# Patient Record
Sex: MALE | Race: BLACK OR AFRICAN AMERICAN | NOT HISPANIC OR LATINO
[De-identification: names, ages, dates, MRNs, and addresses within clinical notes are randomized per-mention and may not be internally consistent; named-entity substitution may affect disease eponyms.]

---

## 2017-04-07 ENCOUNTER — APPOINTMENT (OUTPATIENT)
Dept: RADIATION ONCOLOGY | Facility: CLINIC | Age: 57
End: 2017-04-07

## 2017-04-07 VITALS
HEIGHT: 67 IN | TEMPERATURE: 96.9 F | SYSTOLIC BLOOD PRESSURE: 131 MMHG | RESPIRATION RATE: 16 BRPM | WEIGHT: 232.24 LBS | DIASTOLIC BLOOD PRESSURE: 78 MMHG | OXYGEN SATURATION: 95 % | BODY MASS INDEX: 36.45 KG/M2 | HEART RATE: 56 BPM

## 2017-10-31 ENCOUNTER — APPOINTMENT (OUTPATIENT)
Dept: RADIATION ONCOLOGY | Facility: CLINIC | Age: 57
End: 2017-10-31
Payer: COMMERCIAL

## 2017-10-31 VITALS
SYSTOLIC BLOOD PRESSURE: 125 MMHG | HEART RATE: 64 BPM | HEIGHT: 67 IN | RESPIRATION RATE: 16 BRPM | DIASTOLIC BLOOD PRESSURE: 78 MMHG | OXYGEN SATURATION: 95 % | BODY MASS INDEX: 36.1 KG/M2 | TEMPERATURE: 98.1 F | WEIGHT: 230 LBS

## 2017-10-31 PROCEDURE — 99214 OFFICE O/P EST MOD 30 MIN: CPT | Mod: GC

## 2017-12-14 ENCOUNTER — APPOINTMENT (OUTPATIENT)
Dept: NEPHROLOGY | Facility: CLINIC | Age: 57
End: 2017-12-14

## 2017-12-20 ENCOUNTER — APPOINTMENT (OUTPATIENT)
Dept: NEPHROLOGY | Facility: CLINIC | Age: 57
End: 2017-12-20
Payer: COMMERCIAL

## 2017-12-20 VITALS
DIASTOLIC BLOOD PRESSURE: 87 MMHG | SYSTOLIC BLOOD PRESSURE: 134 MMHG | BODY MASS INDEX: 37.99 KG/M2 | WEIGHT: 242.06 LBS | HEIGHT: 67 IN | OXYGEN SATURATION: 96 % | HEART RATE: 70 BPM

## 2017-12-20 VITALS — SYSTOLIC BLOOD PRESSURE: 140 MMHG | DIASTOLIC BLOOD PRESSURE: 80 MMHG

## 2017-12-20 DIAGNOSIS — Z87.39 PERSONAL HISTORY OF OTHER DISEASES OF THE MUSCULOSKELETAL SYSTEM AND CONNECTIVE TISSUE: ICD-10-CM

## 2017-12-20 DIAGNOSIS — Z87.19 PERSONAL HISTORY OF OTHER DISEASES OF THE DIGESTIVE SYSTEM: ICD-10-CM

## 2017-12-20 DIAGNOSIS — Z86.79 PERSONAL HISTORY OF OTHER DISEASES OF THE CIRCULATORY SYSTEM: ICD-10-CM

## 2017-12-20 DIAGNOSIS — Z87.891 PERSONAL HISTORY OF NICOTINE DEPENDENCE: ICD-10-CM

## 2017-12-20 DIAGNOSIS — Z78.9 OTHER SPECIFIED HEALTH STATUS: ICD-10-CM

## 2017-12-20 PROCEDURE — 99204 OFFICE O/P NEW MOD 45 MIN: CPT

## 2017-12-21 LAB
ALBUMIN MFR SERPL ELPH: 54 %
ALBUMIN SERPL ELPH-MCNC: 4.6 G/DL
ALBUMIN SERPL-MCNC: 4.4 G/DL
ALBUMIN/GLOB SERPL: 1.2 RATIO
ALPHA1 GLOB MFR SERPL ELPH: 4.3 %
ALPHA1 GLOB SERPL ELPH-MCNC: 0.3 G/DL
ALPHA2 GLOB MFR SERPL ELPH: 10.9 %
ALPHA2 GLOB SERPL ELPH-MCNC: 0.9 G/DL
ANION GAP SERPL CALC-SCNC: 16 MMOL/L
B-GLOBULIN MFR SERPL ELPH: 15 %
B-GLOBULIN SERPL ELPH-MCNC: 1.2 G/DL
BUN SERPL-MCNC: 12 MG/DL
CALCIUM SERPL-MCNC: 9.7 MG/DL
CHLORIDE SERPL-SCNC: 101 MMOL/L
CO2 SERPL-SCNC: 24 MMOL/L
CREAT SERPL-MCNC: 0.94 MG/DL
CREAT SPEC-SCNC: 192 MG/DL
CREAT/PROT UR: 0.4 RATIO
DEPRECATED KAPPA LC FREE/LAMBDA SER: 1.19 RATIO
DSDNA AB SER-ACNC: <12 IU/ML
GAMMA GLOB FLD ELPH-MCNC: 1.3 G/DL
GAMMA GLOB MFR SERPL ELPH: 15.8 %
GLUCOSE SERPL-MCNC: 102 MG/DL
HBV SURFACE AB SER QL: NONREACTIVE
HBV SURFACE AG SER QL: NONREACTIVE
HCV AB SER QL: NONREACTIVE
HCV S/CO RATIO: 0.46 S/CO
INTERPRETATION SERPL IEP-IMP: NORMAL
KAPPA LC CSF-MCNC: 2.26 MG/DL
KAPPA LC SERPL-MCNC: 2.68 MG/DL
M PROTEIN SPEC IFE-MCNC: NORMAL
PHOSPHATE SERPL-MCNC: 3.6 MG/DL
POTASSIUM SERPL-SCNC: 4.6 MMOL/L
PROT SERPL-MCNC: 8.1 G/DL
PROT SERPL-MCNC: 8.1 G/DL
PROT UR-MCNC: 76 MG/DL
RHEUMATOID FACT SER QL: <7 IU/ML
SODIUM SERPL-SCNC: 141 MMOL/L

## 2017-12-26 LAB — ANA SER IF-ACNC: NEGATIVE

## 2018-03-21 ENCOUNTER — APPOINTMENT (OUTPATIENT)
Dept: NEPHROLOGY | Facility: CLINIC | Age: 58
End: 2018-03-21

## 2018-04-18 ENCOUNTER — APPOINTMENT (OUTPATIENT)
Dept: UROLOGY | Facility: CLINIC | Age: 58
End: 2018-04-18
Payer: COMMERCIAL

## 2018-04-18 VITALS
RESPIRATION RATE: 16 BRPM | WEIGHT: 238 LBS | SYSTOLIC BLOOD PRESSURE: 130 MMHG | TEMPERATURE: 98.2 F | DIASTOLIC BLOOD PRESSURE: 70 MMHG | BODY MASS INDEX: 37.35 KG/M2 | HEART RATE: 74 BPM | HEIGHT: 67 IN | OXYGEN SATURATION: 97 %

## 2018-04-18 DIAGNOSIS — Z86.79 PERSONAL HISTORY OF OTHER DISEASES OF THE CIRCULATORY SYSTEM: ICD-10-CM

## 2018-04-18 DIAGNOSIS — Z87.898 PERSONAL HISTORY OF OTHER SPECIFIED CONDITIONS: ICD-10-CM

## 2018-04-18 DIAGNOSIS — Z87.09 PERSONAL HISTORY OF OTHER DISEASES OF THE RESPIRATORY SYSTEM: ICD-10-CM

## 2018-04-18 DIAGNOSIS — Z80.1 FAMILY HISTORY OF MALIGNANT NEOPLASM OF TRACHEA, BRONCHUS AND LUNG: ICD-10-CM

## 2018-04-18 PROCEDURE — 99214 OFFICE O/P EST MOD 30 MIN: CPT

## 2018-04-20 LAB
APPEARANCE: CLEAR
BACTERIA: NEGATIVE
BILIRUBIN URINE: NEGATIVE
BLOOD URINE: NEGATIVE
COLOR: ABNORMAL
GLUCOSE QUALITATIVE U: NEGATIVE MG/DL
HYALINE CASTS: 1 /LPF
KETONES URINE: NEGATIVE
LEUKOCYTE ESTERASE URINE: NEGATIVE
MICROSCOPIC-UA: NORMAL
NITRITE URINE: NEGATIVE
PH URINE: 6.5
PROTEIN URINE: 100 MG/DL
PSA SERPL-MCNC: 0.2 NG/ML
RED BLOOD CELLS URINE: 3 /HPF
SPECIFIC GRAVITY URINE: 1.03
SQUAMOUS EPITHELIAL CELLS: 1 /HPF
UROBILINOGEN URINE: NEGATIVE MG/DL
WHITE BLOOD CELLS URINE: 1 /HPF

## 2018-05-04 ENCOUNTER — APPOINTMENT (OUTPATIENT)
Dept: RADIATION ONCOLOGY | Facility: CLINIC | Age: 58
End: 2018-05-04

## 2018-07-26 PROBLEM — Z78.9 ALCOHOL USE: Status: ACTIVE | Noted: 2017-12-20

## 2018-10-17 ENCOUNTER — APPOINTMENT (OUTPATIENT)
Dept: UROLOGY | Facility: CLINIC | Age: 58
End: 2018-10-17
Payer: COMMERCIAL

## 2018-10-17 VITALS
HEART RATE: 75 BPM | TEMPERATURE: 98 F | RESPIRATION RATE: 16 BRPM | DIASTOLIC BLOOD PRESSURE: 70 MMHG | OXYGEN SATURATION: 97 % | SYSTOLIC BLOOD PRESSURE: 130 MMHG

## 2018-10-17 PROCEDURE — 99213 OFFICE O/P EST LOW 20 MIN: CPT

## 2019-04-17 ENCOUNTER — APPOINTMENT (OUTPATIENT)
Dept: UROLOGY | Facility: CLINIC | Age: 59
End: 2019-04-17
Payer: COMMERCIAL

## 2019-04-17 VITALS
SYSTOLIC BLOOD PRESSURE: 130 MMHG | DIASTOLIC BLOOD PRESSURE: 82 MMHG | BODY MASS INDEX: 37.35 KG/M2 | WEIGHT: 238 LBS | TEMPERATURE: 98 F | HEIGHT: 67 IN | OXYGEN SATURATION: 93 % | HEART RATE: 64 BPM | RESPIRATION RATE: 14 BRPM

## 2019-04-17 PROCEDURE — 99214 OFFICE O/P EST MOD 30 MIN: CPT | Mod: 25

## 2019-04-17 PROCEDURE — 51798 US URINE CAPACITY MEASURE: CPT | Mod: 59

## 2019-04-17 PROCEDURE — 51741 ELECTRO-UROFLOWMETRY FIRST: CPT

## 2019-04-17 NOTE — PHYSICAL EXAM
[General Appearance - Well Developed] : well developed [General Appearance - Well Nourished] : well nourished [Normal Appearance] : normal appearance [Well Groomed] : well groomed [General Appearance - In No Acute Distress] : no acute distress [Abdomen Soft] : soft [Abdomen Tenderness] : non-tender [Abdomen Mass (___ Cm)] : no abdominal mass palpated [Abdomen Hernia] : no hernia was discovered [Costovertebral Angle Tenderness] : no ~M costovertebral angle tenderness [] : no respiratory distress [Edema] : no peripheral edema [Respiration, Rhythm And Depth] : normal respiratory rhythm and effort [Exaggerated Use Of Accessory Muscles For Inspiration] : no accessory muscle use [No Focal Deficits] : no focal deficits [Normal Station and Gait] : the gait and station were normal for the patient's age [Cervical Lymph Nodes Enlarged Posterior Bilaterally] : posterior cervical [Supraclavicular Lymph Nodes Enlarged Bilaterally] : supraclavicular [Cervical Lymph Nodes Enlarged Anterior Bilaterally] : anterior cervical [FreeTextEntry1] : pvr 20 ml

## 2019-04-17 NOTE — ASSESSMENT
[FreeTextEntry1] : patient with hx of pca s/p RT 2016\par recent PSA = 0.07 with normal creat and urine again with protein ( Feb 2019) ( evaluated by Nephrology by report)\par LUTS stable with flomax BID \par uroflow today confirms flow rate of 10 ml /sec , nl bell shape , voided 100 ml with PVR 20 ml in 20 sec\par will cont psa check q 6m \par rtc 6m or sooner as needed

## 2019-04-17 NOTE — REVIEW OF SYSTEMS
[Nocturia] : nocturia [Negative] : Gastrointestinal [Dysuria] : no dysuria [Incontinence] : no incontinence [Hesitancy] : no urinary hesitancy [Genital Lesion] : no genital lesions

## 2019-04-17 NOTE — HISTORY OF PRESENT ILLNESS
[FreeTextEntry1] : patient with hx of pca treated with RT 2016 and continued LUTS on Flomax BID because when taking only 1 x/ day noticed increaed nocturia down to 1 x when taking 2 flomax daily \par denies heamturia, had good flow, no hesitnacy , feels empty after void, \par no dysuria or weight loss \par no blood in stool or bowel c/o\par seen by Nephrology for proteinuria and was told it was related to HTN and as RAD normal --nothing else needed to be done

## 2019-04-19 ENCOUNTER — TRANSCRIPTION ENCOUNTER (OUTPATIENT)
Age: 59
End: 2019-04-19

## 2019-10-16 ENCOUNTER — APPOINTMENT (OUTPATIENT)
Dept: UROLOGY | Facility: CLINIC | Age: 59
End: 2019-10-16
Payer: COMMERCIAL

## 2019-10-16 DIAGNOSIS — N52.9 MALE ERECTILE DYSFUNCTION, UNSPECIFIED: ICD-10-CM

## 2019-10-16 PROCEDURE — 99214 OFFICE O/P EST MOD 30 MIN: CPT

## 2019-10-16 NOTE — REVIEW OF SYSTEMS
[Dysuria] : no dysuria [Incontinence] : no incontinence [Hesitancy] : no urinary hesitancy [Nocturia] : no nocturia [Negative] : Gastrointestinal

## 2019-10-16 NOTE — ASSESSMENT
[FreeTextEntry1] : patient with hx of pca\par s/p RT with stabel psa --done today \par LUTS stable with Flomax BID \par prostate small \par bothered by ED - was taking Viagra- stil a bother\par will refer to Dr Maki for further evaluation of ED \par rtc 6-9 m

## 2019-10-16 NOTE — HISTORY OF PRESENT ILLNESS
[FreeTextEntry1] : s/p RT 2016\par doing well\par last PSA normal at 0.07 .\par LUTS better with flomax BId to dec nocturia to 0-1 x\par no dyuria , no hematuria or blood in stool\par eatint wll, good BM ,good flow to void\par no weight changes, diet good\par c/o ED since RT for pca - sl better withviagra

## 2019-10-16 NOTE — PHYSICAL EXAM
[General Appearance - Well Developed] : well developed [General Appearance - Well Nourished] : well nourished [Normal Appearance] : normal appearance [Well Groomed] : well groomed [General Appearance - In No Acute Distress] : no acute distress [Prostate Enlargement] : the prostate was not enlarged [Prostate Tenderness] : the prostate was not tender [No Prostate Nodules] : no prostate nodules [Prostate Size ___ gm] : prostate size [unfilled] gm [FreeTextEntry1] : ++ hemorroids

## 2019-10-18 LAB — PSA SERPL-MCNC: 0.05 NG/ML

## 2019-11-07 ENCOUNTER — APPOINTMENT (OUTPATIENT)
Dept: UROLOGY | Facility: CLINIC | Age: 59
End: 2019-11-07
Payer: COMMERCIAL

## 2019-11-07 PROCEDURE — 99214 OFFICE O/P EST MOD 30 MIN: CPT

## 2019-11-07 NOTE — ASSESSMENT
[FreeTextEntry1] : 58 year old black  with erectile dysfunction after ERT for prostate cancer,\par He is being followed by Dr Eller for his prostate cancer\par He has responded to Viagra for his ED and is pleased\par He is not interested in other intervention.\par Will renew and see in 6 months or prn

## 2019-11-07 NOTE — HISTORY OF PRESENT ILLNESS
[FreeTextEntry1] : 58 year old black male \par  \par no children\par prostate cancer treated by Dr Kamara 2015\par no erectile dysfunction pior to treatment\par since ERT developed erectile dysfunction\par utilizes viagra 100 mg \par pleased with response; able to have sexual intercourse and able to ejaculate

## 2019-11-07 NOTE — PHYSICAL EXAM
[Penis Abnormality] : normal uncircumcised penis [Urethral Meatus] : meatus normal [FreeTextEntry1] : 10.5 cm; atrophic testes

## 2020-04-01 ENCOUNTER — APPOINTMENT (OUTPATIENT)
Dept: UROLOGY | Facility: CLINIC | Age: 60
End: 2020-04-01

## 2020-04-29 ENCOUNTER — TRANSCRIPTION ENCOUNTER (OUTPATIENT)
Age: 60
End: 2020-04-29

## 2020-05-07 ENCOUNTER — APPOINTMENT (OUTPATIENT)
Dept: UROLOGY | Facility: CLINIC | Age: 60
End: 2020-05-07

## 2020-05-14 ENCOUNTER — APPOINTMENT (OUTPATIENT)
Dept: UROLOGY | Facility: CLINIC | Age: 60
End: 2020-05-14
Payer: COMMERCIAL

## 2020-05-14 PROCEDURE — 99213 OFFICE O/P EST LOW 20 MIN: CPT | Mod: 95

## 2020-05-14 NOTE — ASSESSMENT
[FreeTextEntry1] : 58 year old black  with erectile dysfunction after ERT for prostate cancer,\par He is being followed by Dr Eller for his prostate cancer\par Reviewed PSA data\par Will reorder PSA\par He has responded to Viagra for his ED and is pleased with response\par Will renew and see in 6 months or prn\par \par telemedicine 15 mnituts 11:10-11:25

## 2020-05-14 NOTE — HISTORY OF PRESENT ILLNESS
[Nocturia] : nocturia [Erectile Dysfunction] : Erectile Dysfunction [FreeTextEntry1] : 58 year old black male \par  \par no children\par prostate cancer treated by Dr Kamara 2015\par no erectile dysfunction pior to treatment\par since ERT developed erectile dysfunction\par utilizes viagra 100 mg \par pleased with response; able to have sexual intercourse and able to ejaculate\par \par 5.14.2020\par The patient-doctor relationship has been established in a face to face fashion via real time video/audio HIPAA compliant communication using telemedicine software.  The patient's identity has been confirmed.  The patient was previously emailed a copy of the telemedicine consent.  They have had a chance to review and has now given verbal consent and has requested care to be assessed and treated through telemedicine and understands there maybe limitations in this process and they may need further follow up care in the office and or hospital settings.   \par THE FOLLOWING WAS READ TO AND CONSENTED TO BY PATIENT\par By virtue of my participation in this telehealth visit, I am consenting to receive care through telehealth. Telehealth is the use of electronic information and communication technologies by providers to deliver health care to patients at a distance. \par I understand that any care provided to me through [employer]’s telehealth application (“the [employer] selin”) will incorporate security protocols to protect the privacy and security of my health information. If any other application is used to provide care to me, I understand that the technology may not contain appropriate security protocols to protect the privacy and security of my health information. My provider has explained to me the risks associated with the technology platforms that he or she is using to provide care to me. I acknowledge that there are potential risks associated with any technology used while obtaining care through telehealth, including, but not limited to, connectively interruptions, other technical difficulties, and unauthorized access by a third party to one’s health information. Despite these risks, I agree to participate in the telehealth encounter. \par I understand and agree that I or my healthcare provider may terminate a telehealth encounter at any time in the event of a technical malfunction. \par I also understand that my location determines where medicine is being practiced. As a result, I will inform my provider where I am located at the time of my telehealth visit. \par I understand that there may be costs associated with a telehealth visit. I agree that I am responsible for any fees associated with the telehealth services that I receive. \par This Informed Consent for Telehealth Services During a Public Health Emergency will remain in effect solely during the term of the public health emergency.\par \par Verbal consent given on May 14, 2020  11:15 AM by Neo Maki from Mr. YURI LAY \par unable to get video connection\par rescue with [employer] \par Patient aware and consents\par \par Patient has been utilizing sildenafil 100 mg\par No side effects\par Erections last 1/2 - 1 hours\par patient and partner pleased\par patient has not had PSA\par \par \par  \par \par

## 2020-05-14 NOTE — REASON FOR VISIT
[Home] : at home, [unfilled] , at the time of the visit. [Other Location: e.g. Home (Enter Location, City,State)___] : at [unfilled] [Patient] : the patient [Follow-up Visit ___] : a follow-up visit  for [unfilled] [FreeTextEntry4] : NA

## 2021-01-28 ENCOUNTER — APPOINTMENT (OUTPATIENT)
Dept: UROLOGY | Facility: CLINIC | Age: 61
End: 2021-01-28
Payer: COMMERCIAL

## 2021-01-28 VITALS
OXYGEN SATURATION: 96 % | HEART RATE: 78 BPM | WEIGHT: 252 LBS | RESPIRATION RATE: 16 BRPM | TEMPERATURE: 98.4 F | SYSTOLIC BLOOD PRESSURE: 164 MMHG | HEIGHT: 66.5 IN | DIASTOLIC BLOOD PRESSURE: 103 MMHG | BODY MASS INDEX: 40.02 KG/M2

## 2021-01-28 LAB — PSA SERPL-MCNC: 0.03

## 2021-01-28 PROCEDURE — 99213 OFFICE O/P EST LOW 20 MIN: CPT

## 2021-01-28 PROCEDURE — 99072 ADDL SUPL MATRL&STAF TM PHE: CPT

## 2021-01-28 NOTE — ASSESSMENT
[FreeTextEntry1] : 58 year old black  with erectile dysfunction after ERT for prostate cancer,\par He is being followed by Dr Eller for his prostate cancer\par Reviewed PSA data\par Will reorder PSA\par He has responded to Viagra for his ED and is pleased with response\par Will renew and see in 6 months or prn\par \par telemedicine 15 minutes 11:10-11:25\par \par 1.28.2021\par s/p ERT for PCA 2015\par recent PSA 0.03\par \par discussed elevated BP\par medications adjusted\par emphasized need to followup with internist\par \par ED s/p ERT\par responds to Viagra 100 mg\par no side effects\par erections 15-20 minutes with  orgasm\par \par atrophic testes  discussed potential significance \par since responding to PD5 and good libido will not pursue at this point inlight of PCA\par The YURI LAY  expressed fully understanding of the information provided, the consequences and the management.\par

## 2021-01-28 NOTE — HISTORY OF PRESENT ILLNESS
[FreeTextEntry1] : 58 year old black male \par  \par no children\par prostate cancer treated by Dr Kamara 2015\par no erectile dysfunction prior to treatment\par since ERT developed erectile dysfunction\par utilizes Viagra 100 mg \par pleased with response; able to have sexual intercourse and able to ejaculate\par \par 5.14.2020\par The patient-doctor relationship has been established in a face to face fashion via real time video/audio HIPAA compliant communication using telemedicine software.  The patient's identity has been confirmed.  The patient was previously emailed a copy of the telemedicine consent.  They have had a chance to review and has now given verbal consent and has requested care to be assessed and treated through telemedicine and understands there maybe limitations in this process and they may need further follow up care in the office and or hospital settings.   \par THE FOLLOWING WAS READ TO AND CONSENTED TO BY PATIENT\par By virtue of my participation in this telehealth visit, I am consenting to receive care through telehealth. Telehealth is the use of electronic information and communication technologies by providers to deliver health care to patients at a distance. \par I understand that any care provided to me through [employer]’s telehealth application (“the [employer] selin”) will incorporate security protocols to protect the privacy and security of my health information. If any other application is used to provide care to me, I understand that the technology may not contain appropriate security protocols to protect the privacy and security of my health information. My provider has explained to me the risks associated with the technology platforms that he or she is using to provide care to me. I acknowledge that there are potential risks associated with any technology used while obtaining care through telehealth, including, but not limited to, connectively interruptions, other technical difficulties, and unauthorized access by a third party to one’s health information. Despite these risks, I agree to participate in the telehealth encounter. \par I understand and agree that I or my healthcare provider may terminate a telehealth encounter at any time in the event of a technical malfunction. \par I also understand that my location determines where medicine is being practiced. As a result, I will inform my provider where I am located at the time of my telehealth visit. \par I understand that there may be costs associated with a telehealth visit. I agree that I am responsible for any fees associated with the telehealth services that I receive. \par This Informed Consent for Telehealth Services During a Public Health Emergency will remain in effect solely during the term of the public health emergency.\par \par Verbal consent given on May 14, 2020  11:15 AM by Neo Maki from Mr. YURI LAY \par unable to get video connection\par rescue with [employer] \par Patient aware and consents\par \par Patient has been utilizing sildenafil 100 mg\par No side effects\par Erections last 1/2 - 1 hours\par patient and partner pleased\par patient has not had PSA\par \par \par  \par 1.28.2021

## 2021-01-28 NOTE — PHYSICAL EXAM
[Urethral Meatus] : meatus normal [Penis Abnormality] : normal uncircumcised penis [Prostate Enlargement] : the prostate was not enlarged [Prostate Tenderness] : the prostate was not tender [FreeTextEntry1] : 10.5 cm; atrophic testes; atrophic

## 2021-07-15 ENCOUNTER — APPOINTMENT (OUTPATIENT)
Dept: UROLOGY | Facility: CLINIC | Age: 61
End: 2021-07-15

## 2021-08-25 ENCOUNTER — APPOINTMENT (OUTPATIENT)
Dept: UROLOGY | Facility: CLINIC | Age: 61
End: 2021-08-25

## 2021-09-02 ENCOUNTER — APPOINTMENT (OUTPATIENT)
Dept: UROLOGY | Facility: CLINIC | Age: 61
End: 2021-09-02
Payer: COMMERCIAL

## 2021-09-02 VITALS
SYSTOLIC BLOOD PRESSURE: 144 MMHG | HEART RATE: 75 BPM | OXYGEN SATURATION: 97 % | DIASTOLIC BLOOD PRESSURE: 92 MMHG | RESPIRATION RATE: 16 BRPM

## 2021-09-02 PROCEDURE — 99443: CPT

## 2021-09-03 NOTE — ASSESSMENT
[FreeTextEntry1] : 09/02/2021: Mr. Manriquez is a 60 year old male presenting today for a evaluation with a history of prostate cancer that was treated by Dr. Kamara in 2015 with radiation. He previously has seen Dr. Pearl and Dr. Maki. He notes his erections are satisfactory with Sildenafil 100 mg. His last PSA on 12/21/20 was 0.03. He notes he has good urinary control and denies any incontinence. He reports he had nocturia previously, but it has been controlled by Tamsulosin 0.4 mg, one capsule in the morning and one in the evening. He is a  with the special ed department at [employer].\par \par The patient produced a urine sample which will be sent for urinalysis, urine cytology, and urine culture. \par Blood work today included BMP, CBC, alkaline phosphatase, PSA, and testosterone. \par \par I prescribed the patient Sildenafil 100mg which he will take 1 tablet one hour prior to sex. I advised him that side effects may include nasal congestion, warm flushed feeling, blurred vision. More serious side effects are loss of vision or hearing, in which he should stop the medication immediately. \par \par The patient should RTO in 6 months. \par \par Preparation, in-person office visit, and coordination of care took: 33 minutes.

## 2021-09-03 NOTE — HISTORY OF PRESENT ILLNESS
[FreeTextEntry1] : 09/02/2021: Mr. Manriquez is a 60 year old male presenting today for a evaluation with a history of prostate cancer that was treated by Dr. Kamara in 2015 with radiation. He previously has seen Dr. Pearl and Dr. Maki. He notes his erections are satisfactory with Sildenafil 100 mg. His last PSA on 12/21/20 was 0.03. He notes he has good urinary control and denies any incontinence. He reports he had nocturia previously, but it has been controlled by Tamsulosin 0.4 mg, one capsule in the morning and one in the evening. He is a  with the special ed department at [employer].\par

## 2021-09-04 LAB
ALP BLD-CCNC: 64 U/L
ANION GAP SERPL CALC-SCNC: 13 MMOL/L
APPEARANCE: CLEAR
BACTERIA UR CULT: NORMAL
BACTERIA: NEGATIVE
BILIRUBIN URINE: NEGATIVE
BLOOD URINE: NEGATIVE
BUN SERPL-MCNC: 17 MG/DL
CALCIUM SERPL-MCNC: 9.7 MG/DL
CHLORIDE SERPL-SCNC: 106 MMOL/L
CO2 SERPL-SCNC: 24 MMOL/L
COLOR: YELLOW
CREAT SERPL-MCNC: 1.06 MG/DL
GLUCOSE QUALITATIVE U: NEGATIVE
GLUCOSE SERPL-MCNC: 115 MG/DL
HYALINE CASTS: 2 /LPF
KETONES URINE: NEGATIVE
LEUKOCYTE ESTERASE URINE: NEGATIVE
MICROSCOPIC-UA: NORMAL
NITRITE URINE: NEGATIVE
PH URINE: 6
POTASSIUM SERPL-SCNC: 4.5 MMOL/L
PROTEIN URINE: ABNORMAL
PSA SERPL-MCNC: 0.02 NG/ML
RED BLOOD CELLS URINE: 1 /HPF
SODIUM SERPL-SCNC: 143 MMOL/L
SPECIFIC GRAVITY URINE: 1.04
SQUAMOUS EPITHELIAL CELLS: 1 /HPF
TESTOST BND SERPL-MCNC: 4.8 PG/ML
TESTOSTERONE TOTAL S: 151 NG/DL
UROBILINOGEN URINE: NORMAL
WHITE BLOOD CELLS URINE: 1 /HPF

## 2021-09-10 LAB — URINE CYTOLOGY: NORMAL

## 2022-03-02 ENCOUNTER — APPOINTMENT (OUTPATIENT)
Dept: UROLOGY | Facility: CLINIC | Age: 62
End: 2022-03-02
Payer: COMMERCIAL

## 2022-03-02 DIAGNOSIS — K64.4 RESIDUAL HEMORRHOIDAL SKIN TAGS: ICD-10-CM

## 2022-03-02 PROCEDURE — 99214 OFFICE O/P EST MOD 30 MIN: CPT

## 2022-03-06 PROBLEM — K64.4 EXTERNAL HEMORRHOID: Status: ACTIVE | Noted: 2021-01-28

## 2022-03-12 LAB
ALBUMIN SERPL ELPH-MCNC: 4.7 G/DL
ALP BLD-CCNC: 64 U/L
ANION GAP SERPL CALC-SCNC: 16 MMOL/L
APPEARANCE: CLEAR
BACTERIA UR CULT: NORMAL
BACTERIA: NEGATIVE
BILIRUBIN URINE: NEGATIVE
BLOOD URINE: NEGATIVE
BUN SERPL-MCNC: 17 MG/DL
CALCIUM SERPL-MCNC: 9.5 MG/DL
CHLORIDE SERPL-SCNC: 104 MMOL/L
CO2 SERPL-SCNC: 23 MMOL/L
COLOR: YELLOW
CREAT SERPL-MCNC: 0.86 MG/DL
EGFR: 100 ML/MIN/1.73M2
GLUCOSE QUALITATIVE U: NEGATIVE
GLUCOSE SERPL-MCNC: 94 MG/DL
HYALINE CASTS: 1 /LPF
KETONES URINE: NEGATIVE
LEUKOCYTE ESTERASE URINE: NEGATIVE
MICROSCOPIC-UA: NORMAL
NITRITE URINE: NEGATIVE
PH URINE: 6
POTASSIUM SERPL-SCNC: 4.6 MMOL/L
PROT SERPL-MCNC: 7.5 G/DL
PROTEIN URINE: ABNORMAL
PSA FREE FLD-MCNC: NORMAL %
PSA FREE SERPL-MCNC: <0.01 NG/ML
PSA SERPL-MCNC: 0.02 NG/ML
RED BLOOD CELLS URINE: 1 /HPF
SODIUM SERPL-SCNC: 143 MMOL/L
SPECIFIC GRAVITY URINE: 1.03
SQUAMOUS EPITHELIAL CELLS: 1 /HPF
URINE CYTOLOGY: NORMAL
UROBILINOGEN URINE: NORMAL
WHITE BLOOD CELLS URINE: 0 /HPF

## 2022-03-12 NOTE — ADDENDUM
[FreeTextEntry1] : I, Yecenia Husseinin, acted solely as a scribe for Dr. Clinton Loyola on this date 03/02/2022.\par \par All medical record entries made by the Scribe were at my, Dr. Clinton Loyola, direction and personally dictated by me on 03/02/2022. I have reviewed the chart and agree that the record accurately reflects my personal performance of the history, physical exam, assessment and plan.  I have also personally directed, reviewed and agreed with the chart.

## 2022-03-12 NOTE — ASSESSMENT
[FreeTextEntry1] : 09/02/2021: Mr. Manriquez is a 61 year old male presenting today for a evaluation with a history of prostate cancer that was treated by Dr. Kamara in 2015 with radiation. He previously has seen Dr. Pearl and Dr. Maki. He notes his erections are satisfactory with Sildenafil 100 mg. His last PSA on 12/21/20 was 0.03. He notes he has good urinary control and denies any incontinence. He reports he had nocturia previously, but it has been controlled by Tamsulosin 0.4 mg, one capsule in the morning and one in the evening. He is a  with the special ed department at [employer].\par \par 03/02/2022: Patient presents today for follow up. Urination is good. Takes tamsulosin 0.4mg BID. Nocturia x1. No urinary urgency, straining, dysuria, or gross hematuria. Takes sildenafil 100mg for ED which works well. Will be trying to drink more water. No FHx of prostate cancer. Former smoker quit 22 years ago, smoked half a pack a day.  Feels well today and offers no new complaints. \par \par Digital rectal exam found no suspicious rectal masses. No rectal mucosal lesions. Anal tone is normal. Little bit of prolapsed rectal mucosa at 7 o'clock position. The prostate is non tender, with normal texture, discrete borders, and no nodules. Prostate flat. It is a 25 gram transurethral resection size. No gross blood on examining fingers. \par \par Will get blood work at [facility] lab. \par \par Continue tamsulosin 0.4mg BID and sildenafil 100mg PRN. \par \par RTO in 6 months for follow up or sooner if new urinary symptoms develop. \par \par Preparation, in-person office visit, and coordination of care took: 30 minutes

## 2022-03-12 NOTE — PHYSICAL EXAM
[General Appearance - Well Developed] : well developed [General Appearance - Well Nourished] : well nourished [Normal Appearance] : normal appearance [Well Groomed] : well groomed [General Appearance - In No Acute Distress] : no acute distress [Abdomen Soft] : soft [Abdomen Tenderness] : non-tender [Edema] : no peripheral edema [] : no respiratory distress [Respiration, Rhythm And Depth] : normal respiratory rhythm and effort [Exaggerated Use Of Accessory Muscles For Inspiration] : no accessory muscle use [Oriented To Time, Place, And Person] : oriented to person, place, and time [Affect] : the affect was normal [Mood] : the mood was normal [Not Anxious] : not anxious [Normal Station and Gait] : the gait and station were normal for the patient's age [No Focal Deficits] : no focal deficits [FreeTextEntry1] : Digital rectal exam found no suspicious rectal masses. No rectal mucosal lesions. Anal tone is normal. Little bit of prolapsed rectal mucosa at 7 o'clock position. The prostate is non tender, with normal texture, discrete borders, and no nodules. Prostate flat. It is a 25 gram transurethral resection size. No gross blood on examining fingers.

## 2022-03-12 NOTE — HISTORY OF PRESENT ILLNESS
[FreeTextEntry1] : 09/02/2021: Mr. Manriquez is a 60 year old male presenting today for a evaluation with a history of prostate cancer that was treated by Dr. Kamara in 2015 with stereotactic body radiation. He previously has seen Dr. Pearl and Dr. Maki. He notes his erections are satisfactory with Sildenafil 100 mg. His last PSA on 12/21/20 was 0.03. He notes he has good urinary control and denies any incontinence. He reports he had nocturia previously, but it has been controlled by Tamsulosin 0.4 mg, one capsule in the morning and one in the evening. He is a  with the special ed department at [employer].\par \par 03/02/2022: Patient presents today for follow up. Urination is good. Takes tamsulosin 0.4mg BID. Nocturia x1. No urinary urgency, straining, dysuria, or gross hematuria. Takes sildenafil 100mg for ED which works well. Will be trying to drink more water. No FHx of prostate cancer. Former smoker quit 22 years ago, smoked half a pack a day. Feels well today and offers no new complaints.

## 2022-03-14 LAB
TESTOST FREE SERPL-MCNC: 6.9 PG/ML
TESTOST SERPL-MCNC: 225 NG/DL

## 2022-08-01 ENCOUNTER — RESULT REVIEW (OUTPATIENT)
Age: 62
End: 2022-08-01

## 2022-09-16 ENCOUNTER — APPOINTMENT (OUTPATIENT)
Dept: UROLOGY | Facility: CLINIC | Age: 62
End: 2022-09-16

## 2022-09-16 PROCEDURE — 99443: CPT | Mod: 95

## 2022-09-16 NOTE — PHYSICAL EXAM
[General Appearance - Well Developed] : well developed [General Appearance - Well Nourished] : well nourished [Normal Appearance] : normal appearance [Well Groomed] : well groomed [General Appearance - In No Acute Distress] : no acute distress [] : no rash [No Focal Deficits] : no focal deficits

## 2022-09-27 NOTE — HISTORY OF PRESENT ILLNESS
[FreeTextEntry1] : 09/02/2021: Mr. Manriquez is a 60 year old male presenting today for a evaluation with a history of prostate cancer that was treated by Dr. Kamara in 2015 with stereotactic body radiation. He previously has seen Dr. Pearl and Dr. Maki. He notes his erections are satisfactory with Sildenafil 100 mg. His last PSA on 12/21/20 was 0.03. He notes he has good urinary control and denies any incontinence. He reports he had nocturia previously, but it has been controlled by Tamsulosin 0.4 mg, one capsule in the morning and one in the evening. He is a  with the special ed department at [employer].\par \par 03/02/2022: Patient presents today for follow up. Urination is good. Takes tamsulosin 0.4mg BID. Nocturia x1. No urinary urgency, straining, dysuria, or gross hematuria. Takes sildenafil 100mg for ED which works well. Will be trying to drink more water. No FHx of prostate cancer. Former smoker quit 22 years ago, smoked half a pack a day. Feels well today and offers no new complaints. \par \par 09/16/2022: Pt presents to day for a telehealth visit. He reports his urination is adequate. He has been taking Sildenafil and he reports it has improved his erectile function. He denies urinary leakage, incontinence, burning or dysuria.\par \par

## 2022-09-27 NOTE — ASSESSMENT
[FreeTextEntry1] : 09/02/2021: Mr. Manriquez is a 61 year old male presenting today for a evaluation with a history of prostate cancer that was treated by Dr. Kamara in 2015 with radiation. He previously has seen Dr. Pearl and Dr. Maki. He notes his erections are satisfactory with Sildenafil 100 mg. His last PSA on 12/21/20 was 0.03. He notes he has good urinary control and denies any incontinence. He reports he had nocturia previously, but it has been controlled by Tamsulosin 0.4 mg, one capsule in the morning and one in the evening. He is a  with the special ed department at [employer].\par \par 03/02/2022: Patient presents today for follow up. Urination is good. Takes tamsulosin 0.4mg BID. Nocturia x1. No urinary urgency, straining, dysuria, or gross hematuria. Takes sildenafil 100mg for ED which works well. Will be trying to drink more water. No FHx of prostate cancer. Former smoker quit 22 years ago, smoked half a pack a day.  Feels well today and offers no new complaints. \par \par Digital rectal exam found no suspicious rectal masses. No rectal mucosal lesions. Anal tone is normal. Little bit of prolapsed rectal mucosa at 7 o'clock position. The prostate is non tender, with normal texture, discrete borders, and no nodules. Prostate flat. It is a 25 gram transurethral resection size. No gross blood on examining fingers. \par \par Will get blood work at [facility] lab. \par Continue tamsulosin 0.4mg BID and sildenafil 100mg PRN. \par RTO in 6 months for follow up or sooner if new urinary symptoms develop. \par Preparation, in-person office visit, and coordination of care took: 30 minutes \par \par 09/16/2022: Pt reports his urination is adequate. He has been taking Sildenafil and he reports it has improved his erectile function. He denies urinary leakage, incontinence, burning or dysuria.\par \par The patient will provide urine specimen that will be sent for urinalysis, urine culture, and urine cytology at his nearest [facility] facility.\par \par Patient will provide blood sample at his nearest [facility] facility for  BMP, alkaline phosphatase, PSA, and testosterone free and total. \par \par Pt to have have a telehealth visit following results \par \par Duration of telehealth visit and coordination of care took: 22 mins

## 2022-10-03 ENCOUNTER — APPOINTMENT (OUTPATIENT)
Dept: UROLOGY | Facility: CLINIC | Age: 62
End: 2022-10-03

## 2023-04-20 ENCOUNTER — APPOINTMENT (OUTPATIENT)
Dept: UROLOGY | Facility: CLINIC | Age: 63
End: 2023-04-20
Payer: COMMERCIAL

## 2023-04-20 VITALS
DIASTOLIC BLOOD PRESSURE: 79 MMHG | RESPIRATION RATE: 16 BRPM | HEIGHT: 66.5 IN | HEART RATE: 70 BPM | BODY MASS INDEX: 39.7 KG/M2 | SYSTOLIC BLOOD PRESSURE: 144 MMHG | WEIGHT: 250 LBS | OXYGEN SATURATION: 94 % | TEMPERATURE: 98.4 F

## 2023-04-20 DIAGNOSIS — R06.83 SNORING: ICD-10-CM

## 2023-04-20 DIAGNOSIS — C61 MALIGNANT NEOPLASM OF PROSTATE: ICD-10-CM

## 2023-04-20 PROCEDURE — 99215 OFFICE O/P EST HI 40 MIN: CPT

## 2023-04-20 RX ORDER — SILDENAFIL 100 MG/1
100 TABLET, FILM COATED ORAL
Qty: 30 | Refills: 6 | Status: ACTIVE | COMMUNITY
Start: 2019-11-07 | End: 1900-01-01

## 2023-04-22 PROBLEM — R06.83 SNORING: Status: ACTIVE | Noted: 2017-12-20

## 2023-04-22 LAB — PSA SERPL-MCNC: 0.02 NG/ML

## 2023-04-22 NOTE — ADDENDUM
[FreeTextEntry1] : This note was authored by Gertrudis Bennett working as a scribe for Dr.Gary Loyola. I, Dr. Clinton Loyola have reviewed the content of this note and confirm it is true and accurate. I personally performed the history and physical examination and made all the decisions 04/20/2023

## 2023-04-22 NOTE — HISTORY OF PRESENT ILLNESS
[FreeTextEntry1] : 09/02/2021: Mr. Lay is a 60 year old male presenting today for a evaluation with a history of prostate cancer that was treated by Dr. Kamara in 2015 with stereotactic body radiation. He previously has seen Dr. Pearl and Dr. Maki. He notes his erections are satisfactory with Sildenafil 100 mg. His last PSA on 12/21/20 was 0.03. He notes he has good urinary control and denies any incontinence. He reports he had nocturia previously, but it has been controlled by Tamsulosin 0.4 mg, one capsule in the morning and one in the evening. He is a  with the special ed department at [employer].\par \par 03/02/2022: Patient presents today for follow up. Urination is good. Takes tamsulosin 0.4mg BID. Nocturia x1. No urinary urgency, straining, dysuria, or gross hematuria. Takes sildenafil 100mg for ED which works well. Will be trying to drink more water. No FHx of prostate cancer. Former smoker quit 22 years ago, smoked half a pack a day. Feels well today and offers no new complaints. \par \par 09/16/2022: Pt presents to day for a telehealth visit. He reports his urination is adequate. He has been taking Sildenafil and he reports it has improved his erectile function. He denies urinary leakage, incontinence, burning or dysuria.\par \par 04/20/2023: Patient YURI LAY presents today for a follow-up visit. From a urinary standpoint, patient reports urinary frequency as he relates it to fluid intake, nocturia x1, denies urinary urgency and urinary incontinence. \par From a sexual function, erections have remained the same since his last visit. Patient is taking Sildenafil 100 mg and states it has significantly helped. Sexual desire is strong. Wife is still interested. Patient on tamsulosin 2x/ day. PSA drawn with PCP: Patient reports 0.02. Pt works as a . \par

## 2023-04-22 NOTE — PHYSICAL EXAM
[General Appearance - Well Developed] : well developed [General Appearance - Well Nourished] : well nourished [Normal Appearance] : normal appearance [Well Groomed] : well groomed [General Appearance - In No Acute Distress] : no acute distress [No Focal Deficits] : no focal deficits [Abdomen Soft] : soft [Abdomen Tenderness] : non-tender [Costovertebral Angle Tenderness] : no ~M costovertebral angle tenderness [Edema] : no peripheral edema [] : no respiratory distress [Respiration, Rhythm And Depth] : normal respiratory rhythm and effort [Exaggerated Use Of Accessory Muscles For Inspiration] : no accessory muscle use [Oriented To Time, Place, And Person] : oriented to person, place, and time [Affect] : the affect was normal [Mood] : the mood was normal [Not Anxious] : not anxious [Normal Station and Gait] : the gait and station were normal for the patient's age [FreeTextEntry1] : Wears glasses

## 2023-04-22 NOTE — ASSESSMENT
[FreeTextEntry1] : 09/02/2021: Mr. Lay is a 62 year old male presenting today for a evaluation with a history of prostate cancer that was treated by Dr. Kamara in 2015 with radiation. He previously has seen Dr. Pearl and Dr. Maki. He notes his erections are satisfactory with Sildenafil 100 mg. His last PSA on 12/21/20 was 0.03. He notes he has good urinary control and denies any incontinence. He reports he had nocturia previously, but it has been controlled by Tamsulosin 0.4 mg, one capsule in the morning and one in the evening. He is a  with the special ed department at [employer].\par \par 03/02/2022: Patient presents today for follow up. Urination is good. Takes tamsulosin 0.4mg BID. Nocturia x1. No urinary urgency, straining, dysuria, or gross hematuria. Takes sildenafil 100mg for ED which works well. Will be trying to drink more water. No FHx of prostate cancer. Former smoker quit 22 years ago, smoked half a pack a day.  Feels well today and offers no new complaints. \par \par Digital rectal exam found no suspicious rectal masses. No rectal mucosal lesions. Anal tone is normal. Little bit of prolapsed rectal mucosa at 7 o'clock position. The prostate is non tender, with normal texture, discrete borders, and no nodules. Prostate flat. It is a 25 gram transurethral resection size. No gross blood on examining fingers. \par \par Will get blood work at [facility] lab. \par Continue tamsulosin 0.4mg BID and sildenafil 100mg PRN. \par RTO in 6 months for follow up or sooner if new urinary symptoms develop. \par Preparation, in-person office visit, and coordination of care took: 30 minutes \par \par 09/16/2022: Pt reports his urination is adequate. He has been taking Sildenafil and he reports it has improved his erectile function. He denies urinary leakage, incontinence, burning or dysuria.\par \par The patient will provide urine specimen that will be sent for urinalysis, urine culture, and urine cytology at his nearest [facility] facility.\par Patient will provide blood sample at his nearest [facility] facility for  BMP, alkaline phosphatase, PSA, and testosterone free and total. \par Pt to have have a telehealth visit following results \par \par 04/20/2023: Patient YURI LAY presents today for a follow-up visit. From a urinary standpoint, patient reports urinary frequency as he relates it to fluid intake, nocturia x1, denies urinary urgency and urinary incontinence. \par From a sexual function, erections have remained the same since his last visit. Patient is taking Sildenafil 100 mg and states it has significantly helped, however there is room for improvement. Sexual desire is strong. Wife is still interested. Patient on tamsulosin 2x/ day. PSA drawn with PCP: Patient reports 0.02. Pt works as a . \par \par The patient produced a urine sample which will be sent for urinalysis, urine cytology, and urine culture. \par \par Blood work today included BMP, alkaline phosphatase, PSA, and testosterone. \par \par Renewed sildenafil 100 mg PRN and tamsulosin 0.4 mg BID. Advised patient to try a pill and a half for a total of 150 mg of sildenafil for further improvement. \par \par Pt will have a telehealth visit next week for reassessment on sildenafil 150 mg and to review lab results. \par \par Preparation, in person office visit, and coordination of care: 45 minutes.

## 2023-04-24 ENCOUNTER — NON-APPOINTMENT (OUTPATIENT)
Age: 63
End: 2023-04-24

## 2023-04-24 LAB
APPEARANCE: CLEAR
BACTERIA UR CULT: NORMAL
BACTERIA: NEGATIVE /HPF
BILIRUBIN URINE: NEGATIVE
BLOOD URINE: NEGATIVE
CAST: 0 /LPF
COLOR: YELLOW
EPITHELIAL CELLS: 0 /HPF
GLUCOSE QUALITATIVE U: NEGATIVE MG/DL
KETONES URINE: NEGATIVE MG/DL
LEUKOCYTE ESTERASE URINE: NEGATIVE
MICROSCOPIC-UA: NORMAL
NITRITE URINE: NEGATIVE
PH URINE: 5.5
PROTEIN URINE: 30 MG/DL
RED BLOOD CELLS URINE: 0 /HPF
SPECIFIC GRAVITY URINE: 1.02
URINE CYTOLOGY: NORMAL
UROBILINOGEN URINE: 0.2 MG/DL
WHITE BLOOD CELLS URINE: 0 /HPF

## 2023-04-26 LAB
ANION GAP SERPL CALC-SCNC: 14 MMOL/L
BUN SERPL-MCNC: 25 MG/DL
CALCIUM SERPL-MCNC: 9.9 MG/DL
CHLORIDE SERPL-SCNC: 102 MMOL/L
CO2 SERPL-SCNC: 23 MMOL/L
CREAT SERPL-MCNC: 1.02 MG/DL
EGFR: 84 ML/MIN/1.73M2
GLUCOSE SERPL-MCNC: 112 MG/DL
POTASSIUM SERPL-SCNC: 4.8 MMOL/L
SODIUM SERPL-SCNC: 139 MMOL/L
TESTOST SERPL-MCNC: 180 NG/DL

## 2023-06-06 ENCOUNTER — APPOINTMENT (OUTPATIENT)
Dept: UROLOGY | Facility: CLINIC | Age: 63
End: 2023-06-06
Payer: COMMERCIAL

## 2023-06-06 DIAGNOSIS — R35.0 FREQUENCY OF MICTURITION: ICD-10-CM

## 2023-06-06 DIAGNOSIS — R79.89 OTHER SPECIFIED ABNORMAL FINDINGS OF BLOOD CHEMISTRY: ICD-10-CM

## 2023-06-06 DIAGNOSIS — N52.35 ERECTILE DYSFUNCTION FOLLOWING RADIATION THERAPY: ICD-10-CM

## 2023-06-06 DIAGNOSIS — R80.9 PROTEINURIA, UNSPECIFIED: ICD-10-CM

## 2023-06-06 DIAGNOSIS — C61 MALIGNANT NEOPLASM OF PROSTATE: ICD-10-CM

## 2023-06-06 PROCEDURE — 99214 OFFICE O/P EST MOD 30 MIN: CPT | Mod: 95

## 2023-06-10 PROBLEM — N52.35 ERECTILE DYSFUNCTION FOLLOWING RADIATION THERAPY: Status: ACTIVE | Noted: 2019-11-07

## 2023-06-10 PROBLEM — R35.0 FREQUENT URINATION: Status: ACTIVE | Noted: 2018-04-18

## 2023-06-10 PROBLEM — R80.9 PROTEINURIA: Status: ACTIVE | Noted: 2017-12-20

## 2023-06-10 PROBLEM — R79.89 LOW TESTOSTERONE LEVEL IN MALE: Status: ACTIVE | Noted: 2023-06-06

## 2023-06-10 NOTE — PHYSICAL EXAM
[General Appearance - Well Developed] : well developed [General Appearance - Well Nourished] : well nourished [Normal Appearance] : normal appearance [Well Groomed] : well groomed [General Appearance - In No Acute Distress] : no acute distress [] : no respiratory distress [Respiration, Rhythm And Depth] : normal respiratory rhythm and effort [Exaggerated Use Of Accessory Muscles For Inspiration] : no accessory muscle use [Oriented To Time, Place, And Person] : oriented to person, place, and time [Affect] : the affect was normal [Mood] : the mood was normal [Not Anxious] : not anxious [FreeTextEntry1] : Wears glasses

## 2023-06-10 NOTE — HISTORY OF PRESENT ILLNESS
[FreeTextEntry1] : 09/02/2021: Mr. Lay is a 60 year old male presenting today for a evaluation with a history of prostate cancer that was treated by Dr. Kamara in 2015 with stereotactic body radiation. He previously has seen Dr. Pearl and Dr. Maki. He notes his erections are satisfactory with Sildenafil 100 mg. His last PSA on 12/21/20 was 0.03. He notes he has good urinary control and denies any incontinence. He reports he had nocturia previously, but it has been controlled by Tamsulosin 0.4 mg, one capsule in the morning and one in the evening. He is a  with the special ed department at [employer].\par \par 03/02/2022: Patient presents today for follow up. Urination is good. Takes tamsulosin 0.4mg BID. Nocturia x1. No urinary urgency, straining, dysuria, or gross hematuria. Takes sildenafil 100mg for ED which works well. Will be trying to drink more water. No FHx of prostate cancer. Former smoker quit 22 years ago, smoked half a pack a day. Feels well today and offers no new complaints. \par \par 09/16/2022: Pt presents to day for a telehealth visit. He reports his urination is adequate. He has been taking Sildenafil and he reports it has improved his erectile function. He denies urinary leakage, incontinence, burning or dysuria.\par \par 04/20/2023: Patient YURI LAY presents today for a follow-up visit. From a urinary standpoint, patient reports urinary frequency as he relates it to fluid intake, nocturia x1, denies urinary urgency and urinary incontinence. \par From a sexual function, erections have remained the same since his last visit. Patient is taking Sildenafil 100 mg and states it has significantly helped. Sexual desire is strong. Wife is still interested. Patient on tamsulosin 2x/ day. PSA drawn with PCP: Patient reports 0.02. Pt works as a . \par \par 06/06/2023: Mr. LAY presents today for a follow up audio visual telehealth visit for which they gave permission for. The patient was located at home [address] [place] [address] and I was located in my office in [place], [place]. The patient obtained lab work 4/20/23 prior to today's visit. PSA level is excellent at 0.02. Testosterone is low at 180.0. BMP showed elevated glucose at 112, elevated BUN at 25, and normal creatinine at 1.02. The UA demonstrated proteinuria. The Urine cytology and Culture showed normal findings. The patient states erections are adequate while trying sildenafil 150 mg PRN. He received no complaints from his wife. Denies any headaches, heart burn, blue vision and dizziness. He notes libido and vitality is good. He remains active and works out. Denies ever having kidney infections. He smoked about 1 pack a day for about 30 years but currently is a non smoker. He notes urination is good. Denies nocturia. Patient denies dysuria, gross hematuria, burning, urgency, frequency, pushing, straining, or incontinence. He drinks about 1 quart of coffee daily. The patient continues to take  tamsulosin 0.4 mg BID

## 2023-06-10 NOTE — ADDENDUM
[FreeTextEntry1] : This note was authored by Daisy Tony working as a scribe for Dr.Gary Loyola. I, Dr. Clinton Loyola have reviewed the content of this note and confirm it is true and accurate. I personally performed the history and physical examination and made all the decisions 06/06/2023\par

## 2023-06-10 NOTE — ASSESSMENT
[FreeTextEntry1] : 09/02/2021: Mr. Lay is a 62 year old male presenting today for a evaluation with a history of prostate cancer that was treated by Dr. Kamara in 2015 with radiation. He previously has seen Dr. Pearl and Dr. Maki. He notes his erections are satisfactory with Sildenafil 100 mg. His last PSA on 12/21/20 was 0.03. He notes he has good urinary control and denies any incontinence. He reports he had nocturia previously, but it has been controlled by Tamsulosin 0.4 mg, one capsule in the morning and one in the evening. He is a  with the special ed department at [employer].\par \par 03/02/2022: Patient presents today for follow up. Urination is good. Takes tamsulosin 0.4mg BID. Nocturia x1. No urinary urgency, straining, dysuria, or gross hematuria. Takes sildenafil 100mg for ED which works well. Will be trying to drink more water. No FHx of prostate cancer. Former smoker quit 22 years ago, smoked half a pack a day.  Feels well today and offers no new complaints. \par \par Digital rectal exam found no suspicious rectal masses. No rectal mucosal lesions. Anal tone is normal. Little bit of prolapsed rectal mucosa at 7 o'clock position. The prostate is non tender, with normal texture, discrete borders, and no nodules. Prostate flat. It is a 25 gram transurethral resection size. No gross blood on examining fingers. \par \par Will get blood work at [facility] lab. \par Continue tamsulosin 0.4mg BID and sildenafil 100mg PRN. \par RTO in 6 months for follow up or sooner if new urinary symptoms develop. \par Preparation, in-person office visit, and coordination of care took: 30 minutes \par \par 09/16/2022: Pt reports his urination is adequate. He has been taking Sildenafil and he reports it has improved his erectile function. He denies urinary leakage, incontinence, burning or dysuria.\par \par The patient will provide urine specimen that will be sent for urinalysis, urine culture, and urine cytology at his nearest [facility] facility.\par Patient will provide blood sample at his nearest [facility] facility for  BMP, alkaline phosphatase, PSA, and testosterone free and total. \par Pt to have have a telehealth visit following results \par \par 04/20/2023: Patient YURI LAY presents today for a follow-up visit. From a urinary standpoint, patient reports urinary frequency as he relates it to fluid intake, nocturia x1, denies urinary urgency and urinary incontinence. \par From a sexual function, erections have remained the same since his last visit. Patient is taking Sildenafil 100 mg and states it has significantly helped, however there is room for improvement. Sexual desire is strong. Wife is still interested. Patient on tamsulosin 2x/ day. PSA drawn with PCP: Patient reports 0.02. Pt works as a . \par \par The patient produced a urine sample which will be sent for urinalysis, urine cytology, and urine culture. \par Blood work today included BMP, alkaline phosphatase, PSA, and testosterone. \par Renewed sildenafil 100 mg PRN and tamsulosin 0.4 mg BID. Advised patient to try a pill and a half for a total of 150 mg of sildenafil for further improvement. \par \par Pt will have a telehealth visit next week for reassessment on sildenafil 150 mg and to review lab results. \par \par 06/06/2023: Mr. LAY presents today for a follow up audio visual telehealth visit for which they gave permission for. The patient was located at home [address] [place] [address] and I was located in my office in [place], [place]. The patient obtained lab work 4/20/23 prior to today's visit. PSA level is excellent at 0.02. Testosterone is low at 180.0. BMP showed elevated glucose at 112, elevated BUN at 25, and normal creatinine at 1.02. The UA demonstrated proteinuria. The Urine cytology and Culture showed normal findings. The patient states erections are adequate while trying sildenafil 150 mg PRN. He received no complaints from his wife. Denies any headaches, heart burn, blue vision and dizziness. He notes libido and vitality is good. He remains active and works out. Denies ever having kidney infections. He smoked about 1 pack a day for about 30 years but currently is a non smoker. He notes urination is good. Denies nocturia. Patient denies dysuria, gross hematuria, burning, urgency, frequency, pushing, straining, or incontinence. He drinks about 1 quart of coffee daily. The patient continues to take  tamsulosin 0.4 mg BID\par \jennifer Reviewed and discussed laboratory work of 4/20/23 which He  obtained prior to today's visit as requested.\par \jennifer I informed the patient to increase fluid intake as his urine was somewhat concentrated. \par \jennifer Pt will go to his nearest [facility] lab in July for blood work including, androstenedione, CBC with diff, CMP, dehydroepiandrosterone serum, dehydroepiandrosterone -sulfate serum, dihydrotestosterone, estradiol, estrogen total, FSH, LH, progesterone, prolactin, SHBG, and TSH, BMP, alkaline phosphatase, PSA, and testosterone and a urine sample which will be sent for urinalysis, urine culture, and urine cytology.\par \jennifer Pt will schedule a telehealth visit 3-4 days after lab work. \par \jennifer Preparation, audio-visual visit, and coordination of care took : 30 Minutes

## 2023-10-12 ENCOUNTER — APPOINTMENT (OUTPATIENT)
Dept: ORTHOPEDIC SURGERY | Facility: CLINIC | Age: 63
End: 2023-10-12
Payer: COMMERCIAL

## 2023-10-12 VITALS — HEIGHT: 66.5 IN | WEIGHT: 259 LBS | BODY MASS INDEX: 41.13 KG/M2

## 2023-10-12 PROCEDURE — 20611 DRAIN/INJ JOINT/BURSA W/US: CPT | Mod: RT

## 2023-10-12 PROCEDURE — 73564 X-RAY EXAM KNEE 4 OR MORE: CPT | Mod: RT

## 2023-10-12 PROCEDURE — J3490M: CUSTOM

## 2023-10-12 PROCEDURE — 99203 OFFICE O/P NEW LOW 30 MIN: CPT | Mod: 25

## 2023-10-26 ENCOUNTER — APPOINTMENT (OUTPATIENT)
Dept: ORTHOPEDIC SURGERY | Facility: CLINIC | Age: 63
End: 2023-10-26
Payer: COMMERCIAL

## 2023-10-26 DIAGNOSIS — E66.9 OBESITY, UNSPECIFIED: ICD-10-CM

## 2023-10-26 PROCEDURE — 99213 OFFICE O/P EST LOW 20 MIN: CPT

## 2023-10-27 RX ORDER — HYALURONATE SODIUM 20 MG/2 ML
20 SYRINGE (ML) INTRAARTICULAR
Qty: 3 | Refills: 0 | Status: ACTIVE | COMMUNITY
Start: 2023-10-27 | End: 1900-01-01

## 2023-11-09 ENCOUNTER — APPOINTMENT (OUTPATIENT)
Dept: ORTHOPEDIC SURGERY | Facility: CLINIC | Age: 63
End: 2023-11-09
Payer: COMMERCIAL

## 2023-11-09 PROCEDURE — 20611 DRAIN/INJ JOINT/BURSA W/US: CPT | Mod: RT

## 2023-11-09 PROCEDURE — 99213 OFFICE O/P EST LOW 20 MIN: CPT | Mod: 25

## 2023-11-15 ENCOUNTER — APPOINTMENT (OUTPATIENT)
Dept: ORTHOPEDIC SURGERY | Facility: CLINIC | Age: 63
End: 2023-11-15
Payer: COMMERCIAL

## 2023-11-15 PROCEDURE — 99213 OFFICE O/P EST LOW 20 MIN: CPT | Mod: 25

## 2023-11-15 PROCEDURE — 20611 DRAIN/INJ JOINT/BURSA W/US: CPT | Mod: RT

## 2023-11-30 ENCOUNTER — APPOINTMENT (OUTPATIENT)
Dept: ORTHOPEDIC SURGERY | Facility: CLINIC | Age: 63
End: 2023-11-30
Payer: COMMERCIAL

## 2023-11-30 VITALS — HEIGHT: 66.5 IN | BODY MASS INDEX: 41.13 KG/M2 | WEIGHT: 259 LBS

## 2023-11-30 DIAGNOSIS — M17.11 UNILATERAL PRIMARY OSTEOARTHRITIS, RIGHT KNEE: ICD-10-CM

## 2023-11-30 PROCEDURE — 99212 OFFICE O/P EST SF 10 MIN: CPT | Mod: 25

## 2023-11-30 PROCEDURE — 20611 DRAIN/INJ JOINT/BURSA W/US: CPT | Mod: RT

## 2024-07-25 ENCOUNTER — APPOINTMENT (OUTPATIENT)
Dept: ORTHOPEDIC SURGERY | Facility: CLINIC | Age: 64
End: 2024-07-25
Payer: COMMERCIAL

## 2024-07-25 VITALS — WEIGHT: 259 LBS | BODY MASS INDEX: 41.13 KG/M2 | HEIGHT: 66.5 IN

## 2024-07-25 DIAGNOSIS — E66.9 OBESITY, UNSPECIFIED: ICD-10-CM

## 2024-07-25 DIAGNOSIS — M17.11 UNILATERAL PRIMARY OSTEOARTHRITIS, RIGHT KNEE: ICD-10-CM

## 2024-07-25 PROCEDURE — 99213 OFFICE O/P EST LOW 20 MIN: CPT

## 2024-07-25 NOTE — ASSESSMENT
[FreeTextEntry1] : Presents today for acute exacerbation of chronic right knee osteoarthritis. He reports great relief with euflexxa injections done in November 2023. Will set up authorization for a repeat series today. He defers any type of prescription medications at this time. RTC once authorization is approved.   I am working today under the supervision of Dr. Fuentes and I am following the plan of care of Dr. Fuentes as described by him on this date.  Progress note completed by Kavitha Sharp PA-C under the supervision of Dr. Fuentes.

## 2024-07-25 NOTE — IMAGING
[de-identified] : Right Knee Exam: Inspection: No effusion, no warmth, no ecchymosis Palpation: Medial joint line tenderness to palpation, negative Yana Range of motion: 0-130 with mild anterior crepitus Strength: 5/5 quadriceps and hamstring strength Stability: Ligamentously stable Motor and sensory intact distally Gait: Non antalgic gait

## 2024-07-25 NOTE — HISTORY OF PRESENT ILLNESS
[0] : 0 [] : This patient has had an injection before: yes [3] : 3 [Euflexxa] : Euflexxa [de-identified] : 11/9/23: Here to start euflexxa series for the right knee.  11/30/23: Here for right knee euflexxa #3.  7/25/24: here to follow up on right knee. Completed Euflexxa series on 11/30/23 with 6 months relief. Inquiring about repeat. Increased pain with using stairs/bending.  [FreeTextEntry1] : right knee  [de-identified] : Euflexxa [de-identified] : right knee

## 2024-08-15 ENCOUNTER — APPOINTMENT (OUTPATIENT)
Dept: ORTHOPEDIC SURGERY | Facility: CLINIC | Age: 64
End: 2024-08-15
Payer: COMMERCIAL

## 2024-08-15 VITALS — WEIGHT: 259 LBS | BODY MASS INDEX: 41.13 KG/M2 | HEIGHT: 66.5 IN

## 2024-08-15 DIAGNOSIS — E66.9 OBESITY, UNSPECIFIED: ICD-10-CM

## 2024-08-15 PROCEDURE — 20611 DRAIN/INJ JOINT/BURSA W/US: CPT | Mod: RT

## 2024-08-15 NOTE — ASSESSMENT
[FreeTextEntry1] : Right knee euflexxa #1 today - tolerated well  Discussed post procedure recommendations  RTC 1 week to continue series   I am working today under the supervision of Dr. Fuentes and I am following the plan of care of Dr. Fuentes as described by him on this date.  Progress note completed by Kavitha Sharp PA-C under the supervision of Dr. Fuentes.

## 2024-08-15 NOTE — PROCEDURE
[FreeTextEntry3] : Large joint injection was performed of the right knee. The indication for this procedure was pain, inflammation and x-ray evidence of Osteoarthritis on this or prior visit. The site was prepped with alcohol, betadine, ethyl chloride sprayed topically and sterile technique used. An injection of EUFLEXXA 2ml, series #1 was used.  Patient was advised to call if redness, pain or fever occur, apply ice for 15 minutes out of every hour for the next 12-24 hours as tolerated and patient was advised to rest the joint(s) for 2 days. Patient has tried OTC's including aspirin, Ibuprofen, Aleve, etc or prescription NSAIDS, and/or exercises at home and/or physical therapy without satisfactory response, patient had decreased mobility in the joint and the risks benefits, and alternatives have been discussed, and verbal consent was obtained. Ultrasound guidance was indicated for this patient due to prior failure or difficult injection. All ultrasound images have been permanently captured and stored accordingly in our picture archiving and communication system. Visualization of the needle and placement of injection was performed without complication.

## 2024-08-15 NOTE — IMAGING
[de-identified] : Right Knee Exam: Inspection: No effusion, no warmth, no ecchymosis Palpation: Medial joint line tenderness to palpation, negative Yana Range of motion: 0-130 with mild anterior crepitus Strength: 5/5 quadriceps and hamstring strength Stability: Ligamentously stable Motor and sensory intact distally Gait: Non antalgic gait

## 2024-08-15 NOTE — HISTORY OF PRESENT ILLNESS
[0] : 0 [] : This patient has had an injection before: yes [Euflexxa] : Euflexxa [3] : 3 [de-identified] : 11/9/23: Here to start euflexxa series for the right knee.  11/30/23: Here for right knee euflexxa #3.  7/25/24: here to follow up on right knee. Completed Euflexxa series on 11/30/23 with 6 months relief. Inquiring about repeat. Increased pain with using stairs/bending.  8/15/24: here for right knee Euflexxa #1.  [FreeTextEntry1] : right knee  [de-identified] : Euflexxa [de-identified] : right knee

## 2024-08-22 ENCOUNTER — APPOINTMENT (OUTPATIENT)
Dept: ORTHOPEDIC SURGERY | Facility: CLINIC | Age: 64
End: 2024-08-22
Payer: COMMERCIAL

## 2024-08-22 VITALS — HEIGHT: 66.5 IN | WEIGHT: 259 LBS | BODY MASS INDEX: 41.13 KG/M2

## 2024-08-22 DIAGNOSIS — M17.11 UNILATERAL PRIMARY OSTEOARTHRITIS, RIGHT KNEE: ICD-10-CM

## 2024-08-22 PROCEDURE — 20611 DRAIN/INJ JOINT/BURSA W/US: CPT | Mod: RT

## 2024-08-22 NOTE — HISTORY OF PRESENT ILLNESS
[0] : 0 [] : This patient has had an injection before: yes [3] : 3 [Euflexxa] : Euflexxa [de-identified] : 11/9/23: Here to start euflexxa series for the right knee.  11/30/23: Here for right knee euflexxa #3.  7/25/24: here to follow up on right knee. Completed Euflexxa series on 11/30/23 with 6 months relief. Inquiring about repeat. Increased pain with using stairs/bending.  8/15/24: here for right knee Euflexxa #1.  8/22/24: here for right knee Euflexxa #2.  [FreeTextEntry1] : right knee  [de-identified] : Euflexxa [de-identified] : right knee

## 2024-08-22 NOTE — ASSESSMENT
[FreeTextEntry1] : Right knee euflexxa #2 today - tolerated well  Discussed post procedure recommendations  RTC 1 week to continue series   I am working today under the supervision of Dr. Fuentes and I am following the plan of care of Dr. Fuentes as described by him on this date. Progress note completed by Chelsie Hussein PA-C under the supervision of Dr. Fuentes.

## 2024-08-22 NOTE — PROCEDURE
[FreeTextEntry3] : Large joint injection was performed of the right knee. The indication for this procedure was pain, inflammation and x-ray evidence of Osteoarthritis on this or prior visit. The site was prepped with alcohol, betadine, ethyl chloride sprayed topically and sterile technique used. An injection of EUFLEXXA 2ml, series #2 was used.  Patient was advised to call if redness, pain or fever occur, apply ice for 15 minutes out of every hour for the next 12-24 hours as tolerated and patient was advised to rest the joint(s) for 2 days. Patient has tried OTC's including aspirin, Ibuprofen, Aleve, etc or prescription NSAIDS, and/or exercises at home and/or physical therapy without satisfactory response, patient had decreased mobility in the joint and the risks benefits, and alternatives have been discussed, and verbal consent was obtained. Ultrasound guidance was indicated for this patient due to prior failure or difficult injection. All ultrasound images have been permanently captured and stored accordingly in our picture archiving and communication system. Visualization of the needle and placement of injection was performed without complication.

## 2024-08-22 NOTE — IMAGING
[de-identified] : Right Knee Exam: Inspection: No effusion, no warmth, no ecchymosis Palpation: Medial joint line tenderness to palpation, negative Yana Range of motion: 0-130 with mild anterior crepitus Strength: 5/5 quadriceps and hamstring strength Stability: Ligamentously stable Motor and sensory intact distally Gait: Non antalgic gait

## 2024-09-05 ENCOUNTER — APPOINTMENT (OUTPATIENT)
Dept: ORTHOPEDIC SURGERY | Facility: CLINIC | Age: 64
End: 2024-09-05
Payer: COMMERCIAL

## 2024-09-05 VITALS — WEIGHT: 259 LBS | BODY MASS INDEX: 41.13 KG/M2 | HEIGHT: 66.5 IN

## 2024-09-05 DIAGNOSIS — M17.11 UNILATERAL PRIMARY OSTEOARTHRITIS, RIGHT KNEE: ICD-10-CM

## 2024-09-05 DIAGNOSIS — E66.9 OBESITY, UNSPECIFIED: ICD-10-CM

## 2024-09-05 PROCEDURE — 20611 DRAIN/INJ JOINT/BURSA W/US: CPT | Mod: RT

## 2024-09-05 NOTE — PROCEDURE
[FreeTextEntry3] : Large joint injection was performed of the right knee. The indication for this procedure was pain, inflammation and x-ray evidence of Osteoarthritis on this or prior visit. The site was prepped with alcohol, betadine, ethyl chloride sprayed topically and sterile technique used. An injection of EUFLEXXA 2ml, series #3 was used.  Patient was advised to call if redness, pain or fever occur, apply ice for 15 minutes out of every hour for the next 12-24 hours as tolerated and patient was advised to rest the joint(s) for 2 days. Patient has tried OTC's including aspirin, Ibuprofen, Aleve, etc or prescription NSAIDS, and/or exercises at home and/or physical therapy without satisfactory response, patient had decreased mobility in the joint and the risks benefits, and alternatives have been discussed, and verbal consent was obtained. Ultrasound guidance was indicated for this patient due to prior failure or difficult injection. All ultrasound images have been permanently captured and stored accordingly in our picture archiving and communication system. Visualization of the needle and placement of injection was performed without complication.

## 2024-09-05 NOTE — HISTORY OF PRESENT ILLNESS
[0] : 0 [] : This patient has had an injection before: yes [3] : 3 [Euflexxa] : Euflexxa [de-identified] : 11/9/23: Here to start euflexxa series for the right knee.  11/30/23: Here for right knee euflexxa #3.  7/25/24: here to follow up on right knee. Completed Euflexxa series on 11/30/23 with 6 months relief. Inquiring about repeat. Increased pain with using stairs/bending.  8/15/24: here for right knee Euflexxa #1.  8/22/24: here for right knee Euflexxa #2.  9/5/24: here for right knee Euflexxa #3.  [FreeTextEntry1] : right knee  [de-identified] : Euflexxa [de-identified] : right knee

## 2024-09-05 NOTE — IMAGING
[de-identified] : Right Knee Exam: Inspection: No effusion, no warmth, no ecchymosis Palpation: Medial joint line tenderness to palpation, negative Yana Range of motion: 0-130 with mild anterior crepitus Strength: 5/5 quadriceps and hamstring strength Stability: Ligamentously stable Motor and sensory intact distally Gait: Non antalgic gait

## 2024-10-18 ENCOUNTER — APPOINTMENT (OUTPATIENT)
Dept: UROLOGY | Facility: CLINIC | Age: 64
End: 2024-10-18
Payer: COMMERCIAL

## 2024-10-18 DIAGNOSIS — R35.0 FREQUENCY OF MICTURITION: ICD-10-CM

## 2024-10-18 DIAGNOSIS — N52.35 ERECTILE DYSFUNCTION FOLLOWING RADIATION THERAPY: ICD-10-CM

## 2024-10-18 DIAGNOSIS — C61 MALIGNANT NEOPLASM OF PROSTATE: ICD-10-CM

## 2024-10-18 DIAGNOSIS — R79.89 OTHER SPECIFIED ABNORMAL FINDINGS OF BLOOD CHEMISTRY: ICD-10-CM

## 2024-10-18 DIAGNOSIS — R80.9 PROTEINURIA, UNSPECIFIED: ICD-10-CM

## 2024-10-18 PROCEDURE — 99215 OFFICE O/P EST HI 40 MIN: CPT

## 2024-12-24 PROBLEM — F10.90 ALCOHOL USE: Status: ACTIVE | Noted: 2017-12-20

## 2025-04-23 ENCOUNTER — APPOINTMENT (OUTPATIENT)
Dept: ORTHOPEDIC SURGERY | Facility: CLINIC | Age: 65
End: 2025-04-23
Payer: COMMERCIAL

## 2025-04-23 VITALS — BODY MASS INDEX: 41.13 KG/M2 | WEIGHT: 259 LBS | HEIGHT: 66.5 IN

## 2025-04-23 DIAGNOSIS — M17.12 UNILATERAL PRIMARY OSTEOARTHRITIS, LEFT KNEE: ICD-10-CM

## 2025-04-23 DIAGNOSIS — Z00.00 ENCOUNTER FOR GENERAL ADULT MEDICAL EXAMINATION W/OUT ABNORMAL FINDINGS: ICD-10-CM

## 2025-04-23 PROCEDURE — J3490M: CUSTOM

## 2025-04-23 PROCEDURE — 20611 DRAIN/INJ JOINT/BURSA W/US: CPT | Mod: LT

## 2025-04-23 PROCEDURE — 73564 X-RAY EXAM KNEE 4 OR MORE: CPT | Mod: LT

## 2025-04-23 PROCEDURE — 99214 OFFICE O/P EST MOD 30 MIN: CPT | Mod: 25

## 2025-04-23 RX ORDER — HYALURONATE SODIUM 30 MG/2 ML
30 SYRINGE (ML) INTRAARTICULAR
Qty: 4 | Refills: 0 | Status: ACTIVE | COMMUNITY
Start: 2025-04-23 | End: 1900-01-01

## 2025-05-07 ENCOUNTER — APPOINTMENT (OUTPATIENT)
Dept: ORTHOPEDIC SURGERY | Facility: CLINIC | Age: 65
End: 2025-05-07
Payer: COMMERCIAL

## 2025-05-07 VITALS — WEIGHT: 259 LBS | BODY MASS INDEX: 41.13 KG/M2 | HEIGHT: 66.5 IN

## 2025-05-07 PROCEDURE — 20611 DRAIN/INJ JOINT/BURSA W/US: CPT | Mod: LT

## 2025-05-07 PROCEDURE — 99213 OFFICE O/P EST LOW 20 MIN: CPT | Mod: 25

## 2025-05-15 ENCOUNTER — APPOINTMENT (OUTPATIENT)
Dept: ORTHOPEDIC SURGERY | Facility: CLINIC | Age: 65
End: 2025-05-15
Payer: COMMERCIAL

## 2025-05-15 PROCEDURE — 20611 DRAIN/INJ JOINT/BURSA W/US: CPT | Mod: LT

## 2025-05-21 ENCOUNTER — APPOINTMENT (OUTPATIENT)
Dept: ORTHOPEDIC SURGERY | Facility: CLINIC | Age: 65
End: 2025-05-21
Payer: COMMERCIAL

## 2025-05-21 VITALS — WEIGHT: 259 LBS | HEIGHT: 66.5 IN | BODY MASS INDEX: 41.13 KG/M2

## 2025-05-21 DIAGNOSIS — E66.9 OBESITY, UNSPECIFIED: ICD-10-CM

## 2025-05-21 PROCEDURE — 20611 DRAIN/INJ JOINT/BURSA W/US: CPT | Mod: LT

## 2025-05-28 ENCOUNTER — APPOINTMENT (OUTPATIENT)
Dept: ORTHOPEDIC SURGERY | Facility: CLINIC | Age: 65
End: 2025-05-28
Payer: COMMERCIAL

## 2025-05-28 VITALS — BODY MASS INDEX: 41.13 KG/M2 | WEIGHT: 259 LBS | HEIGHT: 66.5 IN

## 2025-05-28 DIAGNOSIS — M17.12 UNILATERAL PRIMARY OSTEOARTHRITIS, LEFT KNEE: ICD-10-CM

## 2025-05-28 PROCEDURE — 20611 DRAIN/INJ JOINT/BURSA W/US: CPT | Mod: LT

## 2025-05-28 PROCEDURE — 99213 OFFICE O/P EST LOW 20 MIN: CPT | Mod: 25

## 2025-07-30 ENCOUNTER — APPOINTMENT (OUTPATIENT)
Dept: UROLOGY | Facility: CLINIC | Age: 65
End: 2025-07-30

## 2025-09-12 ENCOUNTER — RX RENEWAL (OUTPATIENT)
Age: 65
End: 2025-09-12